# Patient Record
Sex: MALE | Race: OTHER | URBAN - METROPOLITAN AREA
[De-identification: names, ages, dates, MRNs, and addresses within clinical notes are randomized per-mention and may not be internally consistent; named-entity substitution may affect disease eponyms.]

---

## 2021-01-21 ENCOUNTER — EMERGENCY (EMERGENCY)
Facility: HOSPITAL | Age: 61
LOS: 1 days | Discharge: LEFT BEFORE DISCHARGE | End: 2021-01-21
Attending: EMERGENCY MEDICINE
Payer: COMMERCIAL

## 2021-01-21 VITALS
DIASTOLIC BLOOD PRESSURE: 76 MMHG | OXYGEN SATURATION: 97 % | SYSTOLIC BLOOD PRESSURE: 128 MMHG | TEMPERATURE: 98 F | RESPIRATION RATE: 17 BRPM | HEART RATE: 68 BPM

## 2021-01-21 PROCEDURE — 99282 EMERGENCY DEPT VISIT SF MDM: CPT

## 2021-01-21 RX ORDER — HYDROMORPHONE HYDROCHLORIDE 2 MG/ML
1 INJECTION INTRAMUSCULAR; INTRAVENOUS; SUBCUTANEOUS ONCE
Refills: 0 | Status: DISCONTINUED | OUTPATIENT
Start: 2021-01-21 | End: 2021-01-21

## 2021-01-21 RX ORDER — SODIUM CHLORIDE 9 MG/ML
1000 INJECTION INTRAMUSCULAR; INTRAVENOUS; SUBCUTANEOUS ONCE
Refills: 0 | Status: DISCONTINUED | OUTPATIENT
Start: 2021-01-21 | End: 2021-01-24

## 2021-01-21 NOTE — ED ADULT TRIAGE NOTE - LOCATION:
Sepsis - undifferentiated, concern for pyelo. Also with no BM, concern for SBO.  1) IV Access/IVF/LABS/Lactate (rpt after IVF if elevated)/UA/Cultures  2) CXR  3) IV Abx
Right arm;

## 2021-01-21 NOTE — ED ADULT NURSE NOTE - OBJECTIVE STATEMENT
pt is here for jaw pain.  pt stated that face and swelling on left lower jaw for few days, c/o pain 4-6/10, denied fever or chills, "just want to have pain med and don't want to wait here", no distress noted at this time.

## 2021-01-21 NOTE — ED PROVIDER NOTE - OBJECTIVE STATEMENT
61 y/o male presents with left jaw swelling for the past 1 week.  Pt denies fever, cough, shortness of breath, or difficulty swallowing.

## 2025-02-26 ENCOUNTER — EMERGENCY (EMERGENCY)
Facility: HOSPITAL | Age: 65
LOS: 1 days | Discharge: HOME | End: 2025-02-26
Attending: EMERGENCY MEDICINE
Payer: COMMERCIAL

## 2025-02-26 VITALS
WEIGHT: 179.9 LBS | TEMPERATURE: 98 F | SYSTOLIC BLOOD PRESSURE: 130 MMHG | HEIGHT: 67 IN | DIASTOLIC BLOOD PRESSURE: 75 MMHG | HEART RATE: 71 BPM | RESPIRATION RATE: 18 BRPM | OXYGEN SATURATION: 96 %

## 2025-02-26 VITALS
OXYGEN SATURATION: 97 % | RESPIRATION RATE: 18 BRPM | DIASTOLIC BLOOD PRESSURE: 73 MMHG | SYSTOLIC BLOOD PRESSURE: 125 MMHG | HEART RATE: 58 BPM | TEMPERATURE: 97 F

## 2025-02-26 PROBLEM — Z78.9 OTHER SPECIFIED HEALTH STATUS: Chronic | Status: ACTIVE | Noted: 2021-01-21

## 2025-02-26 LAB
ANION GAP SERPL CALC-SCNC: 4 MMOL/L — LOW (ref 5–17)
APTT BLD: 48.6 SEC — HIGH (ref 24.5–35.6)
BASOPHILS # BLD AUTO: 0.05 K/UL — SIGNIFICANT CHANGE UP (ref 0–0.2)
BASOPHILS NFR BLD AUTO: 0.7 % — SIGNIFICANT CHANGE UP (ref 0–2)
BUN SERPL-MCNC: 30 MG/DL — HIGH (ref 7–18)
CALCIUM SERPL-MCNC: 8.9 MG/DL — SIGNIFICANT CHANGE UP (ref 8.4–10.5)
CHLORIDE SERPL-SCNC: 111 MMOL/L — HIGH (ref 96–108)
CO2 SERPL-SCNC: 26 MMOL/L — SIGNIFICANT CHANGE UP (ref 22–31)
CREAT SERPL-MCNC: 0.74 MG/DL — SIGNIFICANT CHANGE UP (ref 0.5–1.3)
EGFR: 100 ML/MIN/1.73M2 — SIGNIFICANT CHANGE UP
EOSINOPHIL # BLD AUTO: 0.09 K/UL — SIGNIFICANT CHANGE UP (ref 0–0.5)
EOSINOPHIL NFR BLD AUTO: 1.2 % — SIGNIFICANT CHANGE UP (ref 0–6)
FLUAV AG NPH QL: SIGNIFICANT CHANGE UP
FLUBV AG NPH QL: SIGNIFICANT CHANGE UP
GLUCOSE SERPL-MCNC: 113 MG/DL — HIGH (ref 70–99)
HCT VFR BLD CALC: 44.1 % — SIGNIFICANT CHANGE UP (ref 39–50)
HGB BLD-MCNC: 14.7 G/DL — SIGNIFICANT CHANGE UP (ref 13–17)
IMM GRANULOCYTES NFR BLD AUTO: 0.1 % — SIGNIFICANT CHANGE UP (ref 0–0.9)
INR BLD: 0.98 RATIO — SIGNIFICANT CHANGE UP (ref 0.85–1.16)
LYMPHOCYTES # BLD AUTO: 2.63 K/UL — SIGNIFICANT CHANGE UP (ref 1–3.3)
LYMPHOCYTES # BLD AUTO: 34.9 % — SIGNIFICANT CHANGE UP (ref 13–44)
MCHC RBC-ENTMCNC: 30.7 PG — SIGNIFICANT CHANGE UP (ref 27–34)
MCHC RBC-ENTMCNC: 33.3 G/DL — SIGNIFICANT CHANGE UP (ref 32–36)
MCV RBC AUTO: 92.1 FL — SIGNIFICANT CHANGE UP (ref 80–100)
MONOCYTES # BLD AUTO: 0.55 K/UL — SIGNIFICANT CHANGE UP (ref 0–0.9)
MONOCYTES NFR BLD AUTO: 7.3 % — SIGNIFICANT CHANGE UP (ref 2–14)
NEUTROPHILS # BLD AUTO: 4.21 K/UL — SIGNIFICANT CHANGE UP (ref 1.8–7.4)
NEUTROPHILS NFR BLD AUTO: 55.8 % — SIGNIFICANT CHANGE UP (ref 43–77)
NRBC BLD AUTO-RTO: 0 /100 WBCS — SIGNIFICANT CHANGE UP (ref 0–0)
PLATELET # BLD AUTO: 197 K/UL — SIGNIFICANT CHANGE UP (ref 150–400)
POTASSIUM SERPL-MCNC: 4.5 MMOL/L — SIGNIFICANT CHANGE UP (ref 3.5–5.3)
POTASSIUM SERPL-SCNC: 4.5 MMOL/L — SIGNIFICANT CHANGE UP (ref 3.5–5.3)
PROTHROM AB SERPL-ACNC: 11.4 SEC — SIGNIFICANT CHANGE UP (ref 9.9–13.4)
RBC # BLD: 4.79 M/UL — SIGNIFICANT CHANGE UP (ref 4.2–5.8)
RBC # FLD: 13.8 % — SIGNIFICANT CHANGE UP (ref 10.3–14.5)
RSV RNA NPH QL NAA+NON-PROBE: SIGNIFICANT CHANGE UP
SARS-COV-2 RNA SPEC QL NAA+PROBE: SIGNIFICANT CHANGE UP
SODIUM SERPL-SCNC: 141 MMOL/L — SIGNIFICANT CHANGE UP (ref 135–145)
WBC # BLD: 7.54 K/UL — SIGNIFICANT CHANGE UP (ref 3.8–10.5)
WBC # FLD AUTO: 7.54 K/UL — SIGNIFICANT CHANGE UP (ref 3.8–10.5)

## 2025-02-26 PROCEDURE — 99284 EMERGENCY DEPT VISIT MOD MDM: CPT | Mod: 25

## 2025-02-26 PROCEDURE — 36415 COLL VENOUS BLD VENIPUNCTURE: CPT

## 2025-02-26 PROCEDURE — 85025 COMPLETE CBC W/AUTO DIFF WBC: CPT

## 2025-02-26 PROCEDURE — 80048 BASIC METABOLIC PNL TOTAL CA: CPT

## 2025-02-26 PROCEDURE — 71250 CT THORAX DX C-: CPT | Mod: 26

## 2025-02-26 PROCEDURE — 36000 PLACE NEEDLE IN VEIN: CPT

## 2025-02-26 PROCEDURE — 85610 PROTHROMBIN TIME: CPT

## 2025-02-26 PROCEDURE — 71250 CT THORAX DX C-: CPT | Mod: MC

## 2025-02-26 PROCEDURE — 85730 THROMBOPLASTIN TIME PARTIAL: CPT

## 2025-02-26 PROCEDURE — 87637 SARSCOV2&INF A&B&RSV AMP PRB: CPT

## 2025-02-26 NOTE — ED ADULT TRIAGE NOTE - CHIEF COMPLAINT QUOTE
walk in with c/o coughing  blood since three days / pt said he has chronic cough h/o dm /denies any chest pain or fever

## 2025-02-26 NOTE — ED PROVIDER NOTE - PATIENT PORTAL LINK FT
You can access the FollowMyHealth Patient Portal offered by [facility] by registering at the following website: [email or web address]. By joining [employer]’s FollowMyHealth portal, you will also be able to view your health information using other applications (apps) compatible with our system.

## 2025-02-26 NOTE — ED PROVIDER NOTE - NSFOLLOWUPINSTRUCTIONS_ED_ALL_ED_FT
Thank you for choosing [facility] for your healthcare.    You were seen in the Emergency Department for coughing up blood.  In the emergency department you had blood testing and CAT scan of your chest which did not show any obvious causes of your symptoms.  There was a pulmonary nodule noted which can be an early sign of a cancer and you will need to follow this up with your primary care doctor and likely get a repeat x-ray or CAT scan of your chest at some point however we do not believe it was the cause of your coughing up blood at this point in time.  We are including the information for our pulmonologists.  One of our staff members will also contact you via phone to help you make an appointment with pulmonology for follow-up.  Please return to the emergency room if you are coughing up large volumes of blood persistently, if you are having trouble breathing or for any other concerning or emergent medical issues.

## 2025-02-26 NOTE — ED ADULT NURSE NOTE - OBJECTIVE STATEMENT
pt is here for cough.  pt stated that coughing with blood, chronic smoker, denied chest pain or shortness of breath,

## 2025-02-26 NOTE — ED PROVIDER NOTE - OBJECTIVE STATEMENT
64-year-old man with a past medical history of diabetes on metformin smoker more than 2 packs/day presenting reporting progressively worsening hemoptysis over the past 3 days.  Does have chronic cough but usually does not cough up blood.  Denying any weight loss night sweats fevers sick contacts.  No recent travel.  Never incarcerated or in the .  Denying any chest pains.  Denying any shortness of breath.

## 2025-02-26 NOTE — ED PROVIDER NOTE - CLINICAL SUMMARY MEDICAL DECISION MAKING FREE TEXT BOX
Patient presenting with progressively increasing hemoptysis, history of smoking.  Will obtain screening labs and obtain CT of the chest to evaluate for underlying pneumonia or mass.  No reported TB risk factors, highest concern would be for malignancy.  Disposition to be determined.

## 2025-02-26 NOTE — ED ADULT TRIAGE NOTE - MEANS OF ARRIVAL
Perfect serve message sent to on call NP, Naa Cross, regarding patient fall and possible need for head CT. Per NP, since it was a witnessed fall in which the patient did not strike her head and patient was assisted to floor, rather than falling, no need for head CT. Also advised to pass along to day team, if right elbow pain persists they can obtain imaging tomorrow, but for right now the patient does not need x-ray. ambulatory

## 2025-02-26 NOTE — ED PROVIDER NOTE - PROGRESS NOTE DETAILS
No significant hemoptysis palpation in the emergency department.  CT noting a pulmonary nodule but no other significant findings.  Will refer patient to pulmonology for further workup.